# Patient Record
Sex: FEMALE | Race: WHITE | NOT HISPANIC OR LATINO | Employment: FULL TIME | URBAN - METROPOLITAN AREA
[De-identification: names, ages, dates, MRNs, and addresses within clinical notes are randomized per-mention and may not be internally consistent; named-entity substitution may affect disease eponyms.]

---

## 2023-09-08 ENCOUNTER — HOSPITAL ENCOUNTER (OUTPATIENT)
Dept: DATA CONVERSION | Facility: HOSPITAL | Age: 58
Discharge: HOME | End: 2023-09-08
Payer: COMMERCIAL

## 2023-09-08 DIAGNOSIS — Z72.0 TOBACCO USE: ICD-10-CM

## 2023-09-08 DIAGNOSIS — Z12.31 ENCOUNTER FOR SCREENING MAMMOGRAM FOR MALIGNANT NEOPLASM OF BREAST: ICD-10-CM

## 2024-01-30 ENCOUNTER — TELEPHONE (OUTPATIENT)
Dept: PRIMARY CARE | Facility: CLINIC | Age: 59
End: 2024-01-30

## 2024-01-30 ENCOUNTER — OFFICE VISIT (OUTPATIENT)
Dept: PRIMARY CARE | Facility: CLINIC | Age: 59
End: 2024-01-30
Payer: COMMERCIAL

## 2024-01-30 VITALS
SYSTOLIC BLOOD PRESSURE: 118 MMHG | HEART RATE: 75 BPM | HEIGHT: 66 IN | TEMPERATURE: 98 F | WEIGHT: 155 LBS | BODY MASS INDEX: 24.91 KG/M2 | OXYGEN SATURATION: 96 % | DIASTOLIC BLOOD PRESSURE: 70 MMHG

## 2024-01-30 DIAGNOSIS — R73.01 IMPAIRED FASTING GLUCOSE: ICD-10-CM

## 2024-01-30 DIAGNOSIS — H65.03 NON-RECURRENT ACUTE SEROUS OTITIS MEDIA OF BOTH EARS: ICD-10-CM

## 2024-01-30 DIAGNOSIS — J01.00 ACUTE NON-RECURRENT MAXILLARY SINUSITIS: Primary | ICD-10-CM

## 2024-01-30 DIAGNOSIS — R73.03 PREDIABETES: Primary | ICD-10-CM

## 2024-01-30 DIAGNOSIS — E78.5 HYPERLIPIDEMIA LDL GOAL <70: ICD-10-CM

## 2024-01-30 PROBLEM — F32.A MILD DEPRESSION: Status: ACTIVE | Noted: 2019-02-21

## 2024-01-30 PROBLEM — R05.9 COUGH: Status: RESOLVED | Noted: 2019-11-14 | Resolved: 2024-01-30

## 2024-01-30 PROBLEM — M54.50 LUMBAR PAIN: Status: ACTIVE | Noted: 2020-06-11

## 2024-01-30 PROBLEM — F41.1 GAD (GENERALIZED ANXIETY DISORDER): Status: ACTIVE | Noted: 2023-06-22

## 2024-01-30 PROBLEM — N63.0 BREAST LUMP: Status: RESOLVED | Noted: 2018-06-07 | Resolved: 2024-01-30

## 2024-01-30 PROBLEM — K22.70 BARRETT'S ESOPHAGUS WITHOUT DYSPLASIA: Status: ACTIVE | Noted: 2020-06-11

## 2024-01-30 PROBLEM — H00.015 HORDEOLUM EXTERNUM OF LEFT LOWER EYELID: Status: ACTIVE | Noted: 2021-09-20

## 2024-01-30 PROBLEM — J06.9 ACUTE URI: Status: RESOLVED | Noted: 2019-03-22 | Resolved: 2024-01-30

## 2024-01-30 PROBLEM — F17.210 NICOTINE DEPENDENCE, CIGARETTES, UNCOMPLICATED: Status: ACTIVE | Noted: 2019-06-10

## 2024-01-30 PROBLEM — J20.9 ACUTE BRONCHITIS: Status: RESOLVED | Noted: 2019-11-14 | Resolved: 2024-01-30

## 2024-01-30 PROBLEM — R07.89 CHEST WALL PAIN: Status: RESOLVED | Noted: 2019-03-22 | Resolved: 2024-01-30

## 2024-01-30 PROBLEM — R31.9 HEMATURIA: Status: RESOLVED | Noted: 2018-05-10 | Resolved: 2024-01-30

## 2024-01-30 PROBLEM — N39.0 RECURRENT UTI: Status: RESOLVED | Noted: 2022-06-17 | Resolved: 2024-01-30

## 2024-01-30 PROBLEM — R82.90 ABNORMAL URINE: Status: RESOLVED | Noted: 2022-06-17 | Resolved: 2024-01-30

## 2024-01-30 PROBLEM — K57.30 DIVERTICULA OF INTESTINE: Status: ACTIVE | Noted: 2024-01-30

## 2024-01-30 PROBLEM — M54.50 ACUTE LOW BACK PAIN: Status: RESOLVED | Noted: 2024-01-30 | Resolved: 2024-01-30

## 2024-01-30 PROBLEM — R07.2 PRECORDIAL PAIN: Status: RESOLVED | Noted: 2017-04-26 | Resolved: 2024-01-30

## 2024-01-30 PROBLEM — F32.A DEPRESSION: Status: ACTIVE | Noted: 2019-06-10

## 2024-01-30 PROBLEM — G95.9 LUMBAR MYELOPATHY (MULTI): Status: ACTIVE | Noted: 2019-06-10

## 2024-01-30 PROBLEM — H66.92 LEFT OTITIS MEDIA: Status: RESOLVED | Noted: 2020-02-24 | Resolved: 2024-01-30

## 2024-01-30 PROBLEM — F41.8 MIXED ANXIETY DEPRESSIVE DISORDER: Status: ACTIVE | Noted: 2018-06-07

## 2024-01-30 PROBLEM — M79.10 MYALGIA: Status: RESOLVED | Noted: 2023-06-07 | Resolved: 2024-01-30

## 2024-01-30 PROBLEM — R39.15 URINARY URGENCY: Status: RESOLVED | Noted: 2021-08-23 | Resolved: 2024-01-30

## 2024-01-30 PROBLEM — E78.6 LIPOPROTEIN DEFICIENCY: Status: ACTIVE | Noted: 2021-03-11

## 2024-01-30 PROBLEM — K21.9 CHRONIC GERD: Status: ACTIVE | Noted: 2018-06-07

## 2024-01-30 PROBLEM — N39.0 ACUTE UTI: Status: RESOLVED | Noted: 2018-05-10 | Resolved: 2024-01-30

## 2024-01-30 PROBLEM — R35.0 URINARY FREQUENCY: Status: RESOLVED | Noted: 2021-10-13 | Resolved: 2024-01-30

## 2024-01-30 PROBLEM — H69.90 DYSFUNCTION OF EUSTACHIAN TUBE: Status: ACTIVE | Noted: 2020-03-06

## 2024-01-30 PROBLEM — M25.50 MULTIPLE JOINT PAIN: Status: RESOLVED | Noted: 2023-06-07 | Resolved: 2024-01-30

## 2024-01-30 PROCEDURE — 99213 OFFICE O/P EST LOW 20 MIN: CPT | Performed by: FAMILY MEDICINE

## 2024-01-30 RX ORDER — ESCITALOPRAM OXALATE 20 MG/1
20 TABLET ORAL DAILY
COMMUNITY
End: 2024-02-08

## 2024-01-30 RX ORDER — PANTOPRAZOLE SODIUM 40 MG/1
40 TABLET, DELAYED RELEASE ORAL 2 TIMES DAILY
COMMUNITY

## 2024-01-30 RX ORDER — AMOXICILLIN AND CLAVULANATE POTASSIUM 500; 125 MG/1; MG/1
500 TABLET, FILM COATED ORAL 2 TIMES DAILY
Qty: 20 TABLET | Refills: 0 | Status: SHIPPED | OUTPATIENT
Start: 2024-01-30 | End: 2024-02-09

## 2024-01-30 ASSESSMENT — ENCOUNTER SYMPTOMS
WHEEZING: 0
SHORTNESS OF BREATH: 0
MYALGIAS: 0
RHINORRHEA: 1
CHILLS: 0
WEIGHT LOSS: 0
SWEATS: 0
FEVER: 0
HEADACHES: 1
SORE THROAT: 1
COUGH: 1
HEARTBURN: 0
HEMOPTYSIS: 0

## 2024-01-30 ASSESSMENT — PATIENT HEALTH QUESTIONNAIRE - PHQ9
1. LITTLE INTEREST OR PLEASURE IN DOING THINGS: NOT AT ALL
2. FEELING DOWN, DEPRESSED OR HOPELESS: NOT AT ALL
SUM OF ALL RESPONSES TO PHQ9 QUESTIONS 1 AND 2: 0

## 2024-01-30 ASSESSMENT — PAIN SCALES - GENERAL: PAINLEVEL: 0-NO PAIN

## 2024-01-30 NOTE — PROGRESS NOTES
"Subjective   Patient ID: Britni Avalos is a 58 y.o. female who presents for Cough (X 7 days), Headache, Nasal Congestion, Earache, and Sore Throat (Neg home covid test ).    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      Review of Systems   Constitutional:  Negative for chills and fever.   HENT:  Positive for ear pain, postnasal drip, rhinorrhea and sore throat.    Respiratory:  Positive for cough. Negative for shortness of breath and wheezing.    Cardiovascular:  Negative for chest pain.   Musculoskeletal:  Negative for myalgias.   Skin:  Negative for rash.   Neurological:  Positive for headaches.     Objective   /70 (BP Location: Left arm)   Pulse 75   Temp 36.7 °C (98 °F) (Temporal)   Ht 1.676 m (5' 6\")   Wt 70.3 kg (155 lb)   SpO2 96%   BMI 25.02 kg/m²     Physical Exam  Vitals reviewed.   Constitutional:       Appearance: Normal appearance.   HENT:      Right Ear: Hearing, ear canal and external ear normal. A middle ear effusion is present. Tympanic membrane is not erythematous, retracted or bulging.      Left Ear: Hearing, ear canal and external ear normal. A middle ear effusion is present. Tympanic membrane is erythematous. Tympanic membrane is not retracted or bulging.      Nose: Mucosal edema and congestion present. No rhinorrhea.      Right Turbinates: Swollen.      Left Turbinates: Swollen.      Mouth/Throat:      Mouth: Mucous membranes are moist.      Pharynx: Oropharynx is clear.   Eyes:      Conjunctiva/sclera: Conjunctivae normal.   Cardiovascular:      Rate and Rhythm: Normal rate and regular rhythm. "   Pulmonary:      Effort: Pulmonary effort is normal.      Breath sounds: Normal breath sounds.   Neurological:      Mental Status: She is alert.     Assessment/Plan   Diagnoses and all orders for this visit:  Acute non-recurrent maxillary sinusitis/Non-recurrent acute serous otitis media of both ears  New.  Amoxicillin-pot clavulanate (Augmentin) 500-125 mg tablet prescribed.  OTC medications as needed.   Adequate hydration.  Get plenty of rest.  Follow up in 10 days with PCP if symptoms worsen.

## 2024-02-08 DIAGNOSIS — F41.8 MIXED ANXIETY DEPRESSIVE DISORDER: Primary | ICD-10-CM

## 2024-02-08 RX ORDER — ESCITALOPRAM OXALATE 20 MG/1
20 TABLET ORAL DAILY
Qty: 90 TABLET | Refills: 1 | Status: SHIPPED | OUTPATIENT
Start: 2024-02-08

## 2024-02-09 ENCOUNTER — APPOINTMENT (OUTPATIENT)
Dept: PRIMARY CARE | Facility: CLINIC | Age: 59
End: 2024-02-09
Payer: COMMERCIAL

## 2024-02-23 ENCOUNTER — OFFICE VISIT (OUTPATIENT)
Dept: PRIMARY CARE | Facility: CLINIC | Age: 59
End: 2024-02-23
Payer: COMMERCIAL

## 2024-02-23 VITALS
TEMPERATURE: 97.8 F | BODY MASS INDEX: 25.18 KG/M2 | SYSTOLIC BLOOD PRESSURE: 104 MMHG | HEART RATE: 74 BPM | WEIGHT: 156 LBS | OXYGEN SATURATION: 99 % | DIASTOLIC BLOOD PRESSURE: 76 MMHG

## 2024-02-23 DIAGNOSIS — H65.92 FLUID LEVEL BEHIND TYMPANIC MEMBRANE OF LEFT EAR: ICD-10-CM

## 2024-02-23 DIAGNOSIS — N76.0 ACUTE VAGINITIS: Primary | ICD-10-CM

## 2024-02-23 DIAGNOSIS — N89.8 VAGINAL ITCHING: ICD-10-CM

## 2024-02-23 LAB
POC APPEARANCE, URINE: CLEAR
POC BILIRUBIN, URINE: NEGATIVE
POC BLOOD, URINE: NEGATIVE
POC COLOR, URINE: YELLOW
POC GLUCOSE, URINE: NEGATIVE MG/DL
POC KETONES, URINE: NEGATIVE MG/DL
POC LEUKOCYTES, URINE: NEGATIVE
POC NITRITE,URINE: NEGATIVE
POC PH, URINE: 6 PH
POC PROTEIN, URINE: NEGATIVE MG/DL
POC SPECIFIC GRAVITY, URINE: 1.02
POC UROBILINOGEN, URINE: 0.2 EU/DL

## 2024-02-23 PROCEDURE — 4004F PT TOBACCO SCREEN RCVD TLK: CPT | Performed by: NURSE PRACTITIONER

## 2024-02-23 PROCEDURE — 99214 OFFICE O/P EST MOD 30 MIN: CPT | Performed by: NURSE PRACTITIONER

## 2024-02-23 PROCEDURE — 81003 URINALYSIS AUTO W/O SCOPE: CPT | Performed by: NURSE PRACTITIONER

## 2024-02-23 PROCEDURE — 87205 SMEAR GRAM STAIN: CPT

## 2024-02-23 RX ORDER — METRONIDAZOLE 500 MG/1
500 TABLET ORAL 2 TIMES DAILY
Qty: 14 TABLET | Refills: 0 | Status: SHIPPED | OUTPATIENT
Start: 2024-02-23 | End: 2024-02-28 | Stop reason: WASHOUT

## 2024-02-23 ASSESSMENT — PAIN SCALES - GENERAL: PAINLEVEL: 0-NO PAIN

## 2024-02-23 NOTE — PROGRESS NOTES
Subjective   Patient ID: Britni Avalos is a 58 y.o. female who presents for Vaginitis/Bacterial Vaginosis (X1-2 weeks. Tried OTC Monistat, no improvement. Left ear pain.).    HPI   Pt is a 57 yo F who presents for vaginal irritation for the past 1-2 weeks. She has itching, discharge. Denies bleeding. Denies urinary complaints. Was recently on antibiotics. Has used OTC monistat with no relief.     Pt had recent sinus infection treated 1/30/24 and continue to have left ear pain.   No otc used    Review of Systems   Constitutional:  Negative for chills, fatigue and fever.   HENT:  Positive for ear pain. Negative for congestion, hearing loss, rhinorrhea, sinus pressure, sinus pain, sneezing and sore throat.    Respiratory:  Negative for cough and shortness of breath.    Cardiovascular:  Negative for chest pain.   Gastrointestinal:  Negative for abdominal pain, constipation, diarrhea, nausea and vomiting.   Genitourinary:  Positive for vaginal discharge. Negative for dysuria, frequency, hematuria, pelvic pain, urgency, vaginal bleeding and vaginal pain.   Skin:  Negative for rash.       Objective   /76   Pulse 74   Temp 36.6 °C (97.8 °F)   Wt 70.8 kg (156 lb)   SpO2 99%   BMI 25.18 kg/m²     Physical Exam  General Appearance: Comfortable. She is well nourished, and well developed. She is awake, alert, and oriented and appears her stated age. The patient is cooperative with exam.  HEENT: Normal with exception of left MT dull with fluid level.   Neck: Neck reveals supple, no adenopathy, no thyromegaly, or carotid bruits.  Chest: Lungs are clear to auscultation bilaterally with no wheezes, rales, or rhonchi.  Cardiovascular: RRR without MRG. No edema  Abdomen: Abdomen is soft, NT, ND with no masses.  Genitourinary: Bilateral labia reveals no lesions. Thin white discharge to vaginal vault.  Cervix reveals no abnormalities. Uterus is normal in size and normal in position. Bilateral adnexa reveals no masses and no  tenderness.  Skin: Skin reveals good turgor and no rashes.  Neurological: Intact and non-focal. Cranial nerves II - XII are grossly intact.    Assessment/Plan   Diagnoses and all orders for this visit:  Acute vaginitis  -     metroNIDAZOLE (Flagyl) 500 mg tablet; Take 1 tablet (500 mg) by mouth 2 times a day for 7 days.  Symptoms suggestive of BV  Had used Monistat OTC with no relief  Will send affirm swab and await results  Use metronidazole as directed  Vaginal hygiene discussed  Avoid vaginal irritants such as feminine sprays, powders, douches  Wear cotton underwear  Follow-up if not improving in 7 to 10 days  Vaginal itching  -     POCT UA Automated manually resulted  -     Vaginitis Gram Stain For Bacterial Vaginosis + Yeast  Fluid level behind tympanic membrane of left ear  Residual fluid behind left TM likely after recent sinus infection  Use Flonase and Claritin  Self inflation exercises  Follow-up in 2 weeks if not improved

## 2024-02-24 LAB
CLUE CELLS VAG LPF-#/AREA: NORMAL /[LPF]
NUGENT SCORE: 1
YEAST VAG WET PREP-#/AREA: NORMAL

## 2024-02-26 ENCOUNTER — APPOINTMENT (OUTPATIENT)
Dept: PRIMARY CARE | Facility: CLINIC | Age: 59
End: 2024-02-26
Payer: COMMERCIAL

## 2024-02-26 ASSESSMENT — ENCOUNTER SYMPTOMS
FATIGUE: 0
RHINORRHEA: 0
NAUSEA: 0
SHORTNESS OF BREATH: 0
CHILLS: 0
SINUS PRESSURE: 0
HEMATURIA: 0
FREQUENCY: 0
FEVER: 0
SORE THROAT: 0
DYSURIA: 0
DIARRHEA: 0
ABDOMINAL PAIN: 0
VOMITING: 0
SINUS PAIN: 0
COUGH: 0
CONSTIPATION: 0

## 2024-02-28 ENCOUNTER — OFFICE VISIT (OUTPATIENT)
Dept: PRIMARY CARE | Facility: CLINIC | Age: 59
End: 2024-02-28
Payer: COMMERCIAL

## 2024-02-28 VITALS — HEART RATE: 77 BPM | SYSTOLIC BLOOD PRESSURE: 114 MMHG | DIASTOLIC BLOOD PRESSURE: 68 MMHG

## 2024-02-28 DIAGNOSIS — L29.9 ITCHING: Primary | ICD-10-CM

## 2024-02-28 PROCEDURE — 4004F PT TOBACCO SCREEN RCVD TLK: CPT | Performed by: NURSE PRACTITIONER

## 2024-02-28 PROCEDURE — 99213 OFFICE O/P EST LOW 20 MIN: CPT | Performed by: NURSE PRACTITIONER

## 2024-02-28 NOTE — PROGRESS NOTES
Subjective   Patient ID: Britni Avalos is a 58 y.o. female who presents for Allergic Reaction.    MIGUEL Ryder is a 57 yo F who presents for UC follow up for allergic reaction.   Pt was placed on Augmentin for a sinus infection early February. She developed itching to her body that began 2/16/24 while on the antibiotic and has persisted.   She was seen in our office 2/23 for vaginal irritation and itching - she was placed on metronidazole for possible BV. She did not report body itchy at that time    Over the weekend, she was again seen in UC and was placed on predisone 20mg taper for allergic reaction. She also had a steroid injection.   She was advised by our office to stop metronidazole by our office via MyChart response to complains of itching    No new soaps. Lotions, detergents  No contacts will rash    Review of Systems   Constitutional:  Negative for chills and fever.   HENT:  Negative for trouble swallowing and voice change.    Respiratory:  Negative for shortness of breath.    Cardiovascular:  Negative for chest pain.   Gastrointestinal: Negative.    Genitourinary: Negative.    Musculoskeletal: Negative.    Skin:  Negative for color change and rash.        Positive for itching   Allergic/Immunologic: Negative for immunocompromised state.   Neurological:  Negative for headaches.       Objective   /68 (BP Location: Left arm, Patient Position: Sitting)   Pulse 77     Physical Exam  alert and oriented x3, NAD  Neck supple with no JVD  Lungs CTA bilaterally  Heart with RRR with no edema.  skin warm and dry. Complains of itching to arms, thighs, hands, feet. No rash noted, no burrowing  Neuro grossly intact     Assessment/Plan   Diagnoses and all orders for this visit:  Itching  -     hydrOXYzine HCL (Atarax) 25 mg tablet; Take 1 tablet (25 mg) by mouth 2 times a day for 14 days.  Likely allergic reaction from antibiotic  Add antihistamine  Cool compresses to areas of itching  Avoid hot  showers  Topicals as discussed  Follow up 10-14 days INB

## 2024-03-01 ENCOUNTER — TELEPHONE (OUTPATIENT)
Dept: PRIMARY CARE | Facility: CLINIC | Age: 59
End: 2024-03-01
Payer: COMMERCIAL

## 2024-03-01 RX ORDER — HYDROXYZINE HYDROCHLORIDE 25 MG/1
25 TABLET, FILM COATED ORAL 2 TIMES DAILY
Qty: 28 TABLET | Refills: 0 | Status: SHIPPED | OUTPATIENT
Start: 2024-03-01 | End: 2024-03-15

## 2024-03-03 ASSESSMENT — ENCOUNTER SYMPTOMS
SHORTNESS OF BREATH: 0
GASTROINTESTINAL NEGATIVE: 1
MUSCULOSKELETAL NEGATIVE: 1
VOICE CHANGE: 0
TROUBLE SWALLOWING: 0
CHILLS: 0
ROS SKIN COMMENTS: POSITIVE FOR ITCHING
FEVER: 0
HEADACHES: 0
COLOR CHANGE: 0

## 2024-03-27 ENCOUNTER — TELEPHONE (OUTPATIENT)
Dept: PRIMARY CARE | Facility: CLINIC | Age: 59
End: 2024-03-27
Payer: COMMERCIAL

## 2024-03-27 DIAGNOSIS — Z87.898 HISTORY OF ITCHING: Primary | ICD-10-CM

## 2024-03-27 NOTE — TELEPHONE ENCOUNTER
Called and spoke to patient, she verbalized understanding and stated she would come to office and .

## 2024-03-27 NOTE — TELEPHONE ENCOUNTER
----- Message from Sabas Keller MD sent at 3/27/2024  4:09 PM EDT -----  Regarding: FW: Itching  Contact: [phone]  Patient has had itching for more than a month.  I have referred her to dermatology.  Please call her to let her know to  the referral in the office.  ----- Message -----  From: Tessy Egan LPN  Sent: 3/27/2024   2:02 PM EDT  To: Chrissy Green MD  Subject: FW: Itching                                      Please advise   ----- Message -----  From: Britni Avalos  Sent: 3/27/2024   2:02 PM EDT  To:  [place] [id number] Clinical Support Staff  Subject: Itching                                          I am still experiencing intermittent itching. Do I come back in or do I seek a dermatologist?

## 2024-06-28 ENCOUNTER — APPOINTMENT (OUTPATIENT)
Dept: PRIMARY CARE | Facility: CLINIC | Age: 59
End: 2024-06-28
Payer: COMMERCIAL

## 2024-08-30 ENCOUNTER — APPOINTMENT (OUTPATIENT)
Dept: PRIMARY CARE | Facility: CLINIC | Age: 59
End: 2024-08-30
Payer: COMMERCIAL

## 2024-10-18 ENCOUNTER — TELEPHONE (OUTPATIENT)
Dept: PRIMARY CARE | Facility: CLINIC | Age: 59
End: 2024-10-18
Payer: COMMERCIAL

## 2024-10-18 DIAGNOSIS — F41.8 MIXED ANXIETY DEPRESSIVE DISORDER: ICD-10-CM

## 2024-10-18 DIAGNOSIS — E78.2 MIXED HYPERLIPIDEMIA: Primary | ICD-10-CM

## 2024-10-18 DIAGNOSIS — Z00.00 ROUTINE GENERAL MEDICAL EXAMINATION AT A HEALTH CARE FACILITY: ICD-10-CM

## 2024-10-19 RX ORDER — ESCITALOPRAM OXALATE 20 MG/1
20 TABLET ORAL DAILY
Qty: 30 TABLET | Refills: 0 | Status: SHIPPED | OUTPATIENT
Start: 2024-10-19

## 2024-11-01 ENCOUNTER — APPOINTMENT (OUTPATIENT)
Dept: PRIMARY CARE | Facility: CLINIC | Age: 59
End: 2024-11-01
Payer: COMMERCIAL

## 2024-11-13 DIAGNOSIS — F41.8 MIXED ANXIETY DEPRESSIVE DISORDER: ICD-10-CM

## 2024-11-14 ENCOUNTER — TELEPHONE (OUTPATIENT)
Dept: PRIMARY CARE | Facility: CLINIC | Age: 59
End: 2024-11-14
Payer: COMMERCIAL

## 2024-11-14 DIAGNOSIS — Z12.31 ENCOUNTER FOR SCREENING MAMMOGRAM FOR BREAST CANCER: ICD-10-CM

## 2024-11-14 DIAGNOSIS — Z12.2 SCREENING FOR LUNG CANCER: ICD-10-CM

## 2024-11-16 RX ORDER — ESCITALOPRAM OXALATE 20 MG/1
20 TABLET ORAL DAILY
Qty: 30 TABLET | Refills: 0 | Status: SHIPPED | OUTPATIENT
Start: 2024-11-16

## 2024-11-21 ENCOUNTER — TELEPHONE (OUTPATIENT)
Dept: PRIMARY CARE | Facility: CLINIC | Age: 59
End: 2024-11-21

## 2024-11-21 ENCOUNTER — OFFICE VISIT (OUTPATIENT)
Dept: PRIMARY CARE | Facility: CLINIC | Age: 59
End: 2024-11-21
Payer: COMMERCIAL

## 2024-11-21 VITALS
HEIGHT: 66 IN | WEIGHT: 154 LBS | DIASTOLIC BLOOD PRESSURE: 67 MMHG | BODY MASS INDEX: 24.75 KG/M2 | SYSTOLIC BLOOD PRESSURE: 105 MMHG | OXYGEN SATURATION: 96 % | HEART RATE: 79 BPM

## 2024-11-21 DIAGNOSIS — H65.02 NON-RECURRENT ACUTE SEROUS OTITIS MEDIA OF LEFT EAR: Primary | ICD-10-CM

## 2024-11-21 DIAGNOSIS — Z12.31 ENCOUNTER FOR SCREENING MAMMOGRAM FOR MALIGNANT NEOPLASM OF BREAST: Primary | ICD-10-CM

## 2024-11-21 PROBLEM — F17.200 NICOTINE DEPENDENCE: Status: ACTIVE | Noted: 2019-06-10

## 2024-11-21 PROCEDURE — 4004F PT TOBACCO SCREEN RCVD TLK: CPT | Performed by: FAMILY MEDICINE

## 2024-11-21 PROCEDURE — 3008F BODY MASS INDEX DOCD: CPT | Performed by: FAMILY MEDICINE

## 2024-11-21 PROCEDURE — 99213 OFFICE O/P EST LOW 20 MIN: CPT | Performed by: FAMILY MEDICINE

## 2024-11-21 RX ORDER — CLARITHROMYCIN 500 MG/1
500 TABLET, FILM COATED ORAL 2 TIMES DAILY
Qty: 14 TABLET | Refills: 0 | Status: SHIPPED | OUTPATIENT
Start: 2024-11-21 | End: 2024-11-28

## 2024-11-21 ASSESSMENT — ENCOUNTER SYMPTOMS
SORE THROAT: 0
APPETITE CHANGE: 1
FEVER: 0
FATIGUE: 1
WHEEZING: 0
RHINORRHEA: 1
SINUS PRESSURE: 1
HEADACHES: 1
SHORTNESS OF BREATH: 0
COUGH: 1
CHILLS: 0
MYALGIAS: 0

## 2024-11-21 ASSESSMENT — PATIENT HEALTH QUESTIONNAIRE - PHQ9
SUM OF ALL RESPONSES TO PHQ9 QUESTIONS 1 AND 2: 0
1. LITTLE INTEREST OR PLEASURE IN DOING THINGS: NOT AT ALL
2. FEELING DOWN, DEPRESSED OR HOPELESS: NOT AT ALL

## 2024-11-21 ASSESSMENT — COLUMBIA-SUICIDE SEVERITY RATING SCALE - C-SSRS
1. IN THE PAST MONTH, HAVE YOU WISHED YOU WERE DEAD OR WISHED YOU COULD GO TO SLEEP AND NOT WAKE UP?: NO
6. HAVE YOU EVER DONE ANYTHING, STARTED TO DO ANYTHING, OR PREPARED TO DO ANYTHING TO END YOUR LIFE?: NO
2. HAVE YOU ACTUALLY HAD ANY THOUGHTS OF KILLING YOURSELF?: NO

## 2024-11-21 NOTE — TELEPHONE ENCOUNTER
Patient just finished her appointment with [facility] and has requested orders for a mammogram and lung screening.

## 2024-11-21 NOTE — PROGRESS NOTES
"Subjective   Patient ID: Britni Avalos is a 59 y.o. female who presents for Sick Visit (Pt has sinus drainage, sinus pressure, slight forehead headache, cough, left ear pain, low appetite and fatigue x Monday /Covid for negative Tuesday ).    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     Review of Systems   Constitutional:  Positive for appetite change and fatigue. Negative for chills and fever.   HENT:  Positive for ear pain (left), postnasal drip, rhinorrhea (clear) and sinus pressure. Negative for sore throat.    Respiratory:  Positive for cough. Negative for shortness of breath and wheezing.    Musculoskeletal:  Negative for myalgias.   Neurological:  Positive for headaches.     Objective   /67 (BP Location: Left arm, Patient Position: Sitting, BP Cuff Size: Adult)   Pulse 79   Ht 1.676 m (5' 6\")   Wt 69.9 kg (154 lb)   SpO2 96%   BMI 24.86 kg/m²     Physical Exam  Vitals and nursing note reviewed.   Constitutional:       Appearance: Normal appearance. She is normal weight.   HENT:      Right Ear: Hearing, ear canal and external ear normal. A middle ear effusion is present. Tympanic membrane is not erythematous, retracted or bulging.      Left Ear: Hearing, ear canal and external ear normal. A middle ear effusion is present. Tympanic membrane is erythematous. Tympanic membrane is not retracted or bulging.      Nose: Mucosal edema (with erythema) present. No rhinorrhea.      Right Turbinates: Swollen.      Left Turbinates: Swollen.      Mouth/Throat:      Mouth: Mucous membranes are moist.      Pharynx: Oropharynx " is clear.   Eyes:      Conjunctiva/sclera: Conjunctivae normal.   Cardiovascular:      Rate and Rhythm: Normal rate and regular rhythm.      Heart sounds: Normal heart sounds.   Pulmonary:      Effort: Pulmonary effort is normal.      Breath sounds: Normal breath sounds.   Neurological:      Mental Status: She is alert.     Assessment/Plan   Diagnoses and all orders for this visit:  Non-recurrent acute serous otitis media of left ear  New.  Clarithromycin (Biaxin) 500 mg tablet prescribed.  OTC medications as needed.   Adequate hydration.  Get plenty of rest.  Follow up in 1 week if symptoms worsen.

## 2024-11-25 DIAGNOSIS — Z12.2 SCREENING FOR LUNG CANCER: Primary | ICD-10-CM

## 2024-12-05 ENCOUNTER — APPOINTMENT (OUTPATIENT)
Dept: RADIOLOGY | Facility: CLINIC | Age: 59
End: 2024-12-05
Payer: COMMERCIAL

## 2024-12-11 ENCOUNTER — HOSPITAL ENCOUNTER (OUTPATIENT)
Dept: RADIOLOGY | Facility: HOSPITAL | Age: 59
Discharge: HOME | End: 2024-12-11
Payer: COMMERCIAL

## 2024-12-11 DIAGNOSIS — Z12.2 SCREENING FOR LUNG CANCER: ICD-10-CM

## 2024-12-11 PROCEDURE — 71271 CT THORAX LUNG CANCER SCR C-: CPT

## 2024-12-11 PROCEDURE — 71271 CT THORAX LUNG CANCER SCR C-: CPT | Performed by: RADIOLOGY

## 2024-12-13 ENCOUNTER — HOSPITAL ENCOUNTER (OUTPATIENT)
Dept: RADIOLOGY | Facility: CLINIC | Age: 59
Discharge: HOME | End: 2024-12-13
Payer: COMMERCIAL

## 2024-12-13 VITALS — WEIGHT: 154 LBS | BODY MASS INDEX: 24.75 KG/M2 | HEIGHT: 66 IN

## 2024-12-13 DIAGNOSIS — Z12.31 ENCOUNTER FOR SCREENING MAMMOGRAM FOR MALIGNANT NEOPLASM OF BREAST: ICD-10-CM

## 2024-12-13 DIAGNOSIS — F41.8 MIXED ANXIETY DEPRESSIVE DISORDER: ICD-10-CM

## 2024-12-13 PROCEDURE — 77063 BREAST TOMOSYNTHESIS BI: CPT

## 2024-12-13 RX ORDER — ESCITALOPRAM OXALATE 20 MG/1
20 TABLET ORAL DAILY
Qty: 30 TABLET | Refills: 0 | Status: SHIPPED | OUTPATIENT
Start: 2024-12-13

## 2024-12-16 DIAGNOSIS — R92.8 ABNORMALITY OF RIGHT BREAST ON SCREENING MAMMOGRAM: Primary | ICD-10-CM

## 2024-12-18 ENCOUNTER — HOSPITAL ENCOUNTER (OUTPATIENT)
Dept: RADIOLOGY | Facility: HOSPITAL | Age: 59
Discharge: HOME | End: 2024-12-18
Payer: COMMERCIAL

## 2024-12-18 ENCOUNTER — APPOINTMENT (OUTPATIENT)
Dept: PRIMARY CARE | Facility: CLINIC | Age: 59
End: 2024-12-18
Payer: COMMERCIAL

## 2024-12-18 DIAGNOSIS — R92.8 ABNORMALITY OF RIGHT BREAST ON SCREENING MAMMOGRAM: ICD-10-CM

## 2024-12-18 PROCEDURE — 77065 DX MAMMO INCL CAD UNI: CPT | Mod: RIGHT SIDE | Performed by: RADIOLOGY

## 2024-12-18 PROCEDURE — 77065 DX MAMMO INCL CAD UNI: CPT | Mod: RT

## 2024-12-20 DIAGNOSIS — R92.8 ABNORMAL MAMMOGRAM OF RIGHT BREAST: Primary | ICD-10-CM

## 2024-12-23 ENCOUNTER — LAB (OUTPATIENT)
Dept: LAB | Facility: LAB | Age: 59
End: 2024-12-23
Payer: COMMERCIAL

## 2024-12-23 DIAGNOSIS — E78.2 MIXED HYPERLIPIDEMIA: ICD-10-CM

## 2024-12-23 DIAGNOSIS — Z00.00 ROUTINE GENERAL MEDICAL EXAMINATION AT A HEALTH CARE FACILITY: ICD-10-CM

## 2024-12-23 LAB
ALBUMIN SERPL BCP-MCNC: 4.2 G/DL (ref 3.4–5)
ALP SERPL-CCNC: 72 U/L (ref 33–136)
ALT SERPL W P-5'-P-CCNC: 11 U/L (ref 7–45)
ANION GAP SERPL CALCULATED.3IONS-SCNC: 9 MMOL/L (ref 10–20)
AST SERPL W P-5'-P-CCNC: 19 U/L (ref 9–39)
BASOPHILS # BLD AUTO: 0.1 X10*3/UL (ref 0–0.1)
BASOPHILS NFR BLD AUTO: 1.4 %
BILIRUB SERPL-MCNC: 0.6 MG/DL (ref 0–1.2)
BUN SERPL-MCNC: 10 MG/DL (ref 6–23)
CALCIUM SERPL-MCNC: 9.1 MG/DL (ref 8.6–10.3)
CHLORIDE SERPL-SCNC: 104 MMOL/L (ref 98–107)
CHOLEST SERPL-MCNC: 250 MG/DL (ref 0–199)
CHOLEST/HDLC SERPL: 3.8 {RATIO}
CO2 SERPL-SCNC: 29 MMOL/L (ref 21–32)
CREAT SERPL-MCNC: 0.64 MG/DL (ref 0.5–1.05)
EGFRCR SERPLBLD CKD-EPI 2021: >90 ML/MIN/1.73M*2
EOSINOPHIL # BLD AUTO: 0.16 X10*3/UL (ref 0–0.7)
EOSINOPHIL NFR BLD AUTO: 2.3 %
ERYTHROCYTE [DISTWIDTH] IN BLOOD BY AUTOMATED COUNT: 12.9 % (ref 11.5–14.5)
GLUCOSE SERPL-MCNC: 86 MG/DL (ref 74–99)
HCT VFR BLD AUTO: 45.1 % (ref 36–46)
HDLC SERPL-MCNC: 65.4 MG/DL
HGB BLD-MCNC: 14.8 G/DL (ref 12–16)
IMM GRANULOCYTES # BLD AUTO: 0.03 X10*3/UL (ref 0–0.7)
IMM GRANULOCYTES NFR BLD AUTO: 0.4 % (ref 0–0.9)
LDLC SERPL CALC-MCNC: 148 MG/DL
LYMPHOCYTES # BLD AUTO: 1.68 X10*3/UL (ref 1.2–4.8)
LYMPHOCYTES NFR BLD AUTO: 23.7 %
MCH RBC QN AUTO: 31.1 PG (ref 26–34)
MCHC RBC AUTO-ENTMCNC: 32.8 G/DL (ref 32–36)
MCV RBC AUTO: 95 FL (ref 80–100)
MONOCYTES # BLD AUTO: 0.53 X10*3/UL (ref 0.1–1)
MONOCYTES NFR BLD AUTO: 7.5 %
NEUTROPHILS # BLD AUTO: 4.59 X10*3/UL (ref 1.2–7.7)
NEUTROPHILS NFR BLD AUTO: 64.7 %
NON HDL CHOLESTEROL: 185 MG/DL (ref 0–149)
NRBC BLD-RTO: 0 /100 WBCS (ref 0–0)
PLATELET # BLD AUTO: 231 X10*3/UL (ref 150–450)
POTASSIUM SERPL-SCNC: 4.2 MMOL/L (ref 3.5–5.3)
PROT SERPL-MCNC: 6.4 G/DL (ref 6.4–8.2)
RBC # BLD AUTO: 4.76 X10*6/UL (ref 4–5.2)
SODIUM SERPL-SCNC: 138 MMOL/L (ref 136–145)
TRIGL SERPL-MCNC: 183 MG/DL (ref 0–149)
VLDL: 37 MG/DL (ref 0–40)
WBC # BLD AUTO: 7.1 X10*3/UL (ref 4.4–11.3)

## 2024-12-23 PROCEDURE — 80061 LIPID PANEL: CPT

## 2024-12-23 PROCEDURE — 36415 COLL VENOUS BLD VENIPUNCTURE: CPT

## 2024-12-23 PROCEDURE — 85025 COMPLETE CBC W/AUTO DIFF WBC: CPT

## 2024-12-23 PROCEDURE — 80053 COMPREHEN METABOLIC PANEL: CPT

## 2025-01-02 ENCOUNTER — TELEPHONE (OUTPATIENT)
Dept: PRIMARY CARE | Facility: CLINIC | Age: 60
End: 2025-01-02

## 2025-01-02 ENCOUNTER — APPOINTMENT (OUTPATIENT)
Dept: PRIMARY CARE | Facility: CLINIC | Age: 60
End: 2025-01-02
Payer: COMMERCIAL

## 2025-01-02 VITALS
DIASTOLIC BLOOD PRESSURE: 74 MMHG | SYSTOLIC BLOOD PRESSURE: 116 MMHG | BODY MASS INDEX: 25.55 KG/M2 | HEIGHT: 66 IN | WEIGHT: 159 LBS | OXYGEN SATURATION: 97 % | HEART RATE: 66 BPM

## 2025-01-02 DIAGNOSIS — J43.9 MILD EMPHYSEMA (MULTI): Primary | ICD-10-CM

## 2025-01-02 DIAGNOSIS — E78.2 MIXED HYPERLIPIDEMIA: ICD-10-CM

## 2025-01-02 DIAGNOSIS — Z12.4 CERVICAL CANCER SCREENING: ICD-10-CM

## 2025-01-02 DIAGNOSIS — Z00.00 ROUTINE GENERAL MEDICAL EXAMINATION AT A HEALTH CARE FACILITY: ICD-10-CM

## 2025-01-02 DIAGNOSIS — F41.8 MIXED ANXIETY DEPRESSIVE DISORDER: ICD-10-CM

## 2025-01-02 DIAGNOSIS — R91.8 PULMONARY NODULES: ICD-10-CM

## 2025-01-02 DIAGNOSIS — Z01.419 ENCOUNTER FOR GYNECOLOGICAL EXAMINATION WITHOUT ABNORMAL FINDING: ICD-10-CM

## 2025-01-02 DIAGNOSIS — J43.9 MILD EMPHYSEMA (MULTI): ICD-10-CM

## 2025-01-02 DIAGNOSIS — K21.9 CHRONIC GERD: ICD-10-CM

## 2025-01-02 DIAGNOSIS — Z00.00 ANNUAL PHYSICAL EXAM: Primary | ICD-10-CM

## 2025-01-02 PROBLEM — R82.90 ABNORMAL RESULT ON SCREENING URINE TEST: Status: RESOLVED | Noted: 2022-06-17 | Resolved: 2025-01-02

## 2025-01-02 PROBLEM — E78.6 LIPOPROTEIN DEFICIENCY: Status: RESOLVED | Noted: 2021-03-11 | Resolved: 2025-01-02

## 2025-01-02 PROBLEM — H00.015 HORDEOLUM EXTERNUM OF LEFT LOWER EYELID: Status: RESOLVED | Noted: 2021-09-20 | Resolved: 2025-01-02

## 2025-01-02 PROBLEM — H69.90 DYSFUNCTION OF EUSTACHIAN TUBE: Status: RESOLVED | Noted: 2020-03-06 | Resolved: 2025-01-02

## 2025-01-02 PROBLEM — R73.01 IMPAIRED FASTING GLUCOSE: Status: RESOLVED | Noted: 2018-06-07 | Resolved: 2025-01-02

## 2025-01-02 PROBLEM — K22.70 BARRETT'S ESOPHAGUS WITHOUT DYSPLASIA: Status: RESOLVED | Noted: 2020-06-11 | Resolved: 2025-01-02

## 2025-01-02 PROCEDURE — 99396 PREV VISIT EST AGE 40-64: CPT | Performed by: FAMILY MEDICINE

## 2025-01-02 PROCEDURE — 99214 OFFICE O/P EST MOD 30 MIN: CPT | Performed by: FAMILY MEDICINE

## 2025-01-02 PROCEDURE — 87624 HPV HI-RISK TYP POOLED RSLT: CPT

## 2025-01-02 PROCEDURE — 3008F BODY MASS INDEX DOCD: CPT | Performed by: FAMILY MEDICINE

## 2025-01-02 RX ORDER — ESCITALOPRAM OXALATE 20 MG/1
20 TABLET ORAL DAILY
Qty: 90 TABLET | Refills: 3 | Status: SHIPPED | OUTPATIENT
Start: 2025-01-02 | End: 2026-01-02

## 2025-01-02 ASSESSMENT — ENCOUNTER SYMPTOMS
EYES NEGATIVE: 1
CONSTITUTIONAL NEGATIVE: 1
MUSCULOSKELETAL NEGATIVE: 1
ALLERGIC/IMMUNOLOGIC NEGATIVE: 1
NEUROLOGICAL NEGATIVE: 1
GASTROINTESTINAL NEGATIVE: 1
RESPIRATORY NEGATIVE: 1
PSYCHIATRIC NEGATIVE: 1
HEMATOLOGIC/LYMPHATIC NEGATIVE: 1
CARDIOVASCULAR NEGATIVE: 1

## 2025-01-02 ASSESSMENT — PATIENT HEALTH QUESTIONNAIRE - PHQ9
SUM OF ALL RESPONSES TO PHQ9 QUESTIONS 1 AND 2: 0
2. FEELING DOWN, DEPRESSED OR HOPELESS: NOT AT ALL
1. LITTLE INTEREST OR PLEASURE IN DOING THINGS: NOT AT ALL

## 2025-01-02 NOTE — PROGRESS NOTES
"Subjective   Patient ID: Britni Avalos is a 59 y.o. female who presents for Annual Exam (Pt did labs /EKG 6-11-20).    COVID-19 vaccinations are UTD.  Tetanus, shingles, and influenza vaccinations are not UTD.  She declines all of these vaccinations.  She is overdue for a Pap and agrees to have one completed today.  Breast cancer screening is UTD.  She completed a screening colonoscopy last year (Dr. Bacon), and she will need to repeat the test in 3 years.  EKG is UTD.  Fasting labs were completed recently.   1) Mixed hyperlipidemia: uncontrolled, no medication taken, 3.2% ASCVD risk score, took medication in the past and had myalgias so patient stopped medication, tries to follow a low fat diet, exercises 2 days a week (stationary bicycle).    2) Anxiety/Depression: controlled/controlled, compliant with Lexapro, needs refill.    3) Chronic GERD: controlled, compliant with Protonix 40 mg, had EGD last year and will repeat in 3 years, under the care of Dr. Bacon.  4) Pulmonary nodules/Mild emphysema: stable/new, patient asymptomatic.    Review of Systems   Constitutional: Negative.    HENT: Negative.     Eyes: Negative.    Respiratory: Negative.     Cardiovascular: Negative.    Gastrointestinal: Negative.    Genitourinary: Negative.    Musculoskeletal: Negative.    Skin: Negative.    Allergic/Immunologic: Negative.    Neurological: Negative.    Hematological: Negative.    Psychiatric/Behavioral: Negative.       Objective   /74 (BP Location: Left arm, Patient Position: Sitting, BP Cuff Size: Adult)   Pulse 66   Ht 1.676 m (5' 6\")   Wt 72.1 kg (159 lb)   SpO2 97%   BMI 25.66 kg/m²     Physical Exam  Vitals and nursing note reviewed.   Constitutional:       Appearance: Normal appearance.   HENT:      Head: Normocephalic and atraumatic.      Right Ear: Tympanic membrane, ear canal and external ear normal.      Left Ear: Tympanic membrane, ear canal and external ear normal.      Nose: Nose normal. "      Mouth/Throat:      Mouth: Mucous membranes are moist.      Pharynx: Oropharynx is clear.   Eyes:      Extraocular Movements: Extraocular movements intact.      Conjunctiva/sclera: Conjunctivae normal.      Pupils: Pupils are equal, round, and reactive to light.   Neck:      Vascular: No carotid bruit.      Comments: No thyromegaly  Cardiovascular:      Rate and Rhythm: Normal rate and regular rhythm.      Pulses: Normal pulses.      Heart sounds: Normal heart sounds.   Pulmonary:      Effort: Pulmonary effort is normal.      Breath sounds: Normal breath sounds.   Abdominal:      General: Bowel sounds are normal.      Palpations: Abdomen is soft.   Genitourinary:     General: Normal vulva.      Exam position: Lithotomy position.      Vagina: Normal.      Cervix: Normal.      Uterus: Normal.       Adnexa: Right adnexa normal and left adnexa normal.   Musculoskeletal:         General: Normal range of motion.      Cervical back: Normal range of motion and neck supple.      Comments: 5/5 muscle strength x 4 extremities   Skin:     General: Skin is warm and dry.      Capillary Refill: Capillary refill takes less than 2 seconds.   Neurological:      General: No focal deficit present.      Mental Status: She is alert and oriented to person, place, and time.   Psychiatric:         Mood and Affect: Mood normal.         Behavior: Behavior normal.     Assessment/Plan   Diagnoses and all orders for this visit:  Annual physical exam  PE completed.  COVID-19 vaccinations UTD.  Patient declines tetanus, shingles, and influenza vaccinations.   Breast cancer screening is UTD.  Colon cancer screening is UTD.    EKG UTD.  Fasting labs completed recently.  Repeat exam in 1 year.   Encounter for gynecological examination without abnormal finding  Pelvic exam completed.  Pap collected.  Will repeat cervical cancer according to Pap results.  Mixed hyperlipidemia  Uncontrolled  3.2% ASCVD risk score.  No medication taken.  Low fat  diet.  Regular exercise.  Manage weight.  Follow up in 1 year.  Mixed anxiety depressive disorder  Controlled/Controlled  Escitalopram (Lexapro) 20 mg tablet refilled.  Monitor mood.  Follow up in 1 year.   Chronic GERD  Controlled  Compliant with pantoprazole.  EGD completed last year.  Keep follow-up appointments with GI.  Await input.  Follow up in 1 year.  Pulmonary nodules  Stable  Continue with annual CT lung screening.  Follow up in 1 year.  Mild emphysema (Multi)  New  Asymptomatic.  Monitor for symptoms.  Will monitor status with CT lung screening.  Follow up in 1 year.  Cervical cancer screening  THINPREP PAP TEST collected and sent to lab.  Will notify with results.

## 2025-01-15 LAB
CYTOLOGY CMNT CVX/VAG CYTO-IMP: NORMAL
HPV HR 12 DNA GENITAL QL NAA+PROBE: NEGATIVE
HPV HR GENOTYPES PNL CVX NAA+PROBE: NEGATIVE
HPV16 DNA SPEC QL NAA+PROBE: NEGATIVE
HPV18 DNA SPEC QL NAA+PROBE: NEGATIVE
LAB AP HPV GENOTYPE QUESTION: YES
LAB AP HPV HR: NORMAL
LABORATORY COMMENT REPORT: NORMAL
PATH REPORT.TOTAL CANCER: NORMAL

## 2025-06-23 ENCOUNTER — HOSPITAL ENCOUNTER (OUTPATIENT)
Dept: RADIOLOGY | Facility: HOSPITAL | Age: 60
Discharge: HOME | End: 2025-06-23
Payer: COMMERCIAL

## 2025-06-23 DIAGNOSIS — Z12.31 ENCOUNTER FOR SCREENING MAMMOGRAM FOR MALIGNANT NEOPLASM OF BREAST: Primary | ICD-10-CM

## 2025-06-23 DIAGNOSIS — R92.8 ABNORMAL MAMMOGRAM OF RIGHT BREAST: ICD-10-CM

## 2025-06-23 PROCEDURE — 77065 DX MAMMO INCL CAD UNI: CPT | Mod: RT

## 2025-06-23 PROCEDURE — 77065 DX MAMMO INCL CAD UNI: CPT | Mod: RIGHT SIDE | Performed by: RADIOLOGY

## 2026-01-06 ENCOUNTER — APPOINTMENT (OUTPATIENT)
Dept: PRIMARY CARE | Facility: CLINIC | Age: 61
End: 2026-01-06
Payer: COMMERCIAL